# Patient Record
Sex: MALE | Race: WHITE | NOT HISPANIC OR LATINO | Employment: OTHER | URBAN - METROPOLITAN AREA
[De-identification: names, ages, dates, MRNs, and addresses within clinical notes are randomized per-mention and may not be internally consistent; named-entity substitution may affect disease eponyms.]

---

## 2017-01-20 ENCOUNTER — HOSPITAL ENCOUNTER (EMERGENCY)
Facility: HOSPITAL | Age: 57
Discharge: HOME | End: 2017-01-20
Attending: EMERGENCY MEDICINE | Admitting: EMERGENCY MEDICINE

## 2017-01-20 ENCOUNTER — APPOINTMENT (OUTPATIENT)
Dept: GENERAL RADIOLOGY | Facility: HOSPITAL | Age: 57
End: 2017-01-20

## 2017-01-20 VITALS
RESPIRATION RATE: 18 BRPM | HEIGHT: 65 IN | DIASTOLIC BLOOD PRESSURE: 72 MMHG | SYSTOLIC BLOOD PRESSURE: 113 MMHG | BODY MASS INDEX: 29.99 KG/M2 | WEIGHT: 180 LBS | HEART RATE: 56 BPM | OXYGEN SATURATION: 95 % | TEMPERATURE: 98.1 F

## 2017-01-20 DIAGNOSIS — R91.1 PULMONARY NODULE: ICD-10-CM

## 2017-01-20 DIAGNOSIS — R07.89 OTHER CHEST PAIN: Primary | ICD-10-CM

## 2017-01-20 DIAGNOSIS — G40.909 SEIZURE DISORDER (HCC): ICD-10-CM

## 2017-01-20 LAB
ALBUMIN SERPL-MCNC: 3.9 G/DL (ref 3.2–4.8)
ALBUMIN/GLOB SERPL: 1.1 G/DL (ref 1.5–2.5)
ALP SERPL-CCNC: 98 U/L (ref 25–100)
ALT SERPL W P-5'-P-CCNC: 45 U/L (ref 7–40)
ANION GAP SERPL CALCULATED.3IONS-SCNC: 15 MMOL/L (ref 3–11)
AST SERPL-CCNC: 24 U/L (ref 0–33)
BASOPHILS # BLD AUTO: 0.17 10*3/MM3 (ref 0–0.2)
BASOPHILS NFR BLD AUTO: 2.7 % (ref 0–1)
BILIRUB SERPL-MCNC: 0.6 MG/DL (ref 0.3–1.2)
BNP SERPL-MCNC: 11 PG/ML (ref 0–100)
BUN BLD-MCNC: 18 MG/DL (ref 9–23)
BUN/CREAT SERPL: 16.4 (ref 7–25)
CALCIUM SPEC-SCNC: 9.8 MG/DL (ref 8.7–10.4)
CHLORIDE SERPL-SCNC: 104 MMOL/L (ref 99–109)
CO2 SERPL-SCNC: 28 MMOL/L (ref 20–31)
CREAT BLD-MCNC: 1.1 MG/DL (ref 0.6–1.3)
DEPRECATED RDW RBC AUTO: 47.8 FL (ref 37–54)
EOSINOPHIL # BLD AUTO: 0.24 10*3/MM3 (ref 0.1–0.3)
EOSINOPHIL NFR BLD AUTO: 3.8 % (ref 0–3)
ERYTHROCYTE [DISTWIDTH] IN BLOOD BY AUTOMATED COUNT: 13.2 % (ref 11.3–14.5)
GFR SERPL CREATININE-BSD FRML MDRD: 69 ML/MIN/1.73
GLOBULIN UR ELPH-MCNC: 3.5 GM/DL
GLUCOSE BLD-MCNC: 90 MG/DL (ref 70–100)
HCT VFR BLD AUTO: 47.3 % (ref 38.9–50.9)
HGB BLD-MCNC: 16.2 G/DL (ref 13.1–17.5)
HOLD SPECIMEN: NORMAL
HOLD SPECIMEN: NORMAL
IMM GRANULOCYTES # BLD: 0.04 10*3/MM3 (ref 0–0.03)
IMM GRANULOCYTES NFR BLD: 0.6 % (ref 0–0.6)
LIPASE SERPL-CCNC: 37 U/L (ref 6–51)
LYMPHOCYTES # BLD AUTO: 1.44 10*3/MM3 (ref 0.6–4.8)
LYMPHOCYTES NFR BLD AUTO: 22.7 % (ref 24–44)
MCH RBC QN AUTO: 34 PG (ref 27–31)
MCHC RBC AUTO-ENTMCNC: 34.2 G/DL (ref 32–36)
MCV RBC AUTO: 99.4 FL (ref 80–99)
MONOCYTES # BLD AUTO: 0.58 10*3/MM3 (ref 0–1)
MONOCYTES NFR BLD AUTO: 9.2 % (ref 0–12)
NEUTROPHILS # BLD AUTO: 3.86 10*3/MM3 (ref 1.5–8.3)
NEUTROPHILS NFR BLD AUTO: 61 % (ref 41–71)
PLATELET # BLD AUTO: 168 10*3/MM3 (ref 150–450)
PMV BLD AUTO: 9.3 FL (ref 6–12)
POTASSIUM BLD-SCNC: 4 MMOL/L (ref 3.5–5.5)
PROT SERPL-MCNC: 7.4 G/DL (ref 5.7–8.2)
RBC # BLD AUTO: 4.76 10*6/MM3 (ref 4.2–5.76)
SODIUM BLD-SCNC: 147 MMOL/L (ref 132–146)
TROPONIN I SERPL-MCNC: 0 NG/ML (ref 0–0.07)
TROPONIN I SERPL-MCNC: 0 NG/ML (ref 0–0.07)
WBC NRBC COR # BLD: 6.33 10*3/MM3 (ref 3.5–10.8)
WHOLE BLOOD HOLD SPECIMEN: NORMAL
WHOLE BLOOD HOLD SPECIMEN: NORMAL

## 2017-01-20 PROCEDURE — 85025 COMPLETE CBC W/AUTO DIFF WBC: CPT | Performed by: EMERGENCY MEDICINE

## 2017-01-20 PROCEDURE — 99285 EMERGENCY DEPT VISIT HI MDM: CPT

## 2017-01-20 PROCEDURE — 84484 ASSAY OF TROPONIN QUANT: CPT

## 2017-01-20 PROCEDURE — 83690 ASSAY OF LIPASE: CPT | Performed by: EMERGENCY MEDICINE

## 2017-01-20 PROCEDURE — 80053 COMPREHEN METABOLIC PANEL: CPT | Performed by: EMERGENCY MEDICINE

## 2017-01-20 PROCEDURE — 93005 ELECTROCARDIOGRAM TRACING: CPT | Performed by: EMERGENCY MEDICINE

## 2017-01-20 PROCEDURE — 83880 ASSAY OF NATRIURETIC PEPTIDE: CPT | Performed by: EMERGENCY MEDICINE

## 2017-01-20 PROCEDURE — 71010 HC CHEST PA OR AP: CPT

## 2017-01-20 RX ORDER — LEVETIRACETAM 500 MG/1
TABLET ORAL
COMMUNITY

## 2017-01-20 RX ORDER — DOCUSATE SODIUM 100 MG/1
100 CAPSULE, LIQUID FILLED ORAL DAILY
COMMUNITY

## 2017-01-20 RX ORDER — MAGNESIUM HYDROXIDE/ALUMINUM HYDROXICE/SIMETHICONE 120; 1200; 1200 MG/30ML; MG/30ML; MG/30ML
30 SUSPENSION ORAL ONCE
Status: COMPLETED | OUTPATIENT
Start: 2017-01-20 | End: 2017-01-20

## 2017-01-20 RX ORDER — SODIUM CHLORIDE 0.9 % (FLUSH) 0.9 %
10 SYRINGE (ML) INJECTION AS NEEDED
Status: DISCONTINUED | OUTPATIENT
Start: 2017-01-20 | End: 2017-01-20 | Stop reason: HOSPADM

## 2017-01-20 RX ORDER — ASPIRIN 81 MG/1
81 TABLET ORAL DAILY
COMMUNITY

## 2017-01-20 RX ADMIN — LIDOCAINE HYDROCHLORIDE 15 ML: 20 SOLUTION ORAL; TOPICAL at 18:01

## 2017-01-20 RX ADMIN — ALUMINUM HYDROXIDE, MAGNESIUM HYDROXIDE, AND SIMETHICONE 30 ML: 200; 200; 20 SUSPENSION ORAL at 18:01

## 2017-01-20 NOTE — ED PROVIDER NOTES
Subjective   HPI Comments: Alessio William is a 56 y.o. male presenting to the ED with c/o chest pain. Mr. William reports that he has been having chest pain throughout the day today. The pain is located midsternally. He reports feeling sick lately with a runny nose, cough, and reduced appetite. Additional leg swelling. Denies any nausea, sweats, shortness of breath, fevers, dysuria, or chills. No other complaints at this time.    Patient is a 56 y.o. male presenting with chest pain.   History provided by:  Patient  Chest Pain   Pain location:  Substernal area  Pain radiates to:  Does not radiate  Pain severity:  Moderate  Onset quality:  Sudden  Duration:  1 day  Timing:  Constant  Progression:  Worsening  Chronicity:  Recurrent  Relieved by:  Nothing  Worsened by:  Nothing  Ineffective treatments:  None tried  Associated symptoms: cough and lower extremity edema    Associated symptoms: no abdominal pain, no back pain, no dizziness, no fatigue, no fever, no headache, no nausea, no numbness, no shortness of breath, no vomiting and no weakness        Review of Systems   Constitutional: Negative for chills, fatigue and fever.   HENT: Positive for rhinorrhea.    Respiratory: Positive for cough. Negative for shortness of breath.    Cardiovascular: Positive for chest pain and leg swelling.   Gastrointestinal: Negative for abdominal pain, diarrhea, nausea and vomiting.   Genitourinary: Negative for dysuria.   Musculoskeletal: Negative for back pain and joint swelling.   Skin: Negative for color change.   Neurological: Negative for dizziness, syncope, weakness, light-headedness, numbness and headaches.   All other systems reviewed and are negative.      Past Medical History   Diagnosis Date   • Anxiety    • Arthritis    • Cataract    • Down syndrome    • Down's syndrome    • Hernia of abdominal wall    • Hyperlipidemia    • Nasal septal defect    • Vitamin D deficiency    Discharge Summary from 7/25/16  Problem List:  Principal  Problem:  Encephalopathy  Active Problems:  Down syndrome  Depression  Anxiety  Hypotension  Sequela, post-stroke           Presenting Problem/History of Present Illness  Somnolence [R40.0]  Chest pain in adult [R07.9]      Chief Complaint on Day of Discharge:   History of Present Illness on Day of Discharge:      Hospital Course  Patient is a 55 y.o. male with a Hx of Down syndrome, depression, AUBREE, GERD and mild HLD who presented to [facility] ER 07/22/2016 with sudden onset somnolence/mental status change. He returned to his baseline functional state within one day. Diagnosed with encephalopathy, MRI shows a small crescentic area of abnormal signal in the right posterior parietal cortical distribution. Radiologist believes this to be an old insult. EEG was read as generalized slowing. Patient was started on Keppra. Neurology was consulted, suspects post-ictal state, subacute CVA. Reccommended carotid doppler, echocardiogram, outpatient neurology follow-up in 4-6 weeks, continue Keppra, lipitor, and aspirin. Patient to be discharged with these recommendations. To follow-up with PCP in one week.    ECHO from 7/23/16  Interpretation Summary   · All left ventricular wall segments contract normally.  · SUBMITTED FOR INTERPRETATION 25 JULY 2016; IV SALINE STUDIES UNINTERPRETABLE.  · Left ventricular function is normal. Estimated EF = 68%.         Carotid Duplex from 7/23/16  Interpretation Summary   · No hemodynamically significant carotid stenosis bilaterally.   From  visit  01-Aug-2016 3:44 PM ED General Note * Final, General Timothy Schneider MD (Resident) [Signed: 01-Aug-2016 3:48 PM], Donald Santiago MD (Attending) [Signed: 03-Aug-2016 11:20 PM] [Last Updated: 03-Aug-2016 11:20 PM]   Evaluation:   -    CHIEF COMPLAINT: Chest pain   HISTORY OF PRESENT ILLNESS: Patient is a 55-year-old male with past medical history of Down syndrome, GERD, recent CVA presents to the emergency department complaining of chest pain.  Patient has moderate MR and is a poor historian, he is accompanied by a non-primary caregiver at bedside. Per caregiver, patient began complaining of periodic chest pain this morning after breakfast. Patient reports this pain is substernal and does not radiate. He also endorses mild facial numbness, but denies headache, shortness of breath, abdominal pain.   PAST MEDICAL HISTORY: GERD, hyperlipidemia, Down syndrome, anxiety, CVA 2 weeks ago with subsequent seizures, entrapped hernia   PAST SURGICAL HISTORY: Laparoscopic hernia repair   ALLERGIES: Reviewed per nursing records.   SOCIAL HISTORY: Negative for current tobacco, alcohol, or recreational drug use.   FAMILY MEDICAL HISTORY: Reviewed with patient and not pertinent   REVIEW OF SYSTEMS: 14 point review of systems were reviewed with the patient and negative except as noted in the HPI.   VITALS (last 24h) [retrieved for ANTHONY ADAMS at 01 Aug 2016 15:45]:   Tc: 37.2 Tmax: 37.2 @ 01 Aug 15:14   Tf: 98.9 Tmax: 98.9 @ 01 Aug 15:14   HR: 77 (53 - 98)   BP: 92/61 (92/61 - 112/85)   RR: 25 (10 - 25)  SpO2: 96% (93% - 99%)   PHYSICAL EXAM:   GENERAL: Well-developed, well-nourished, no acute distress   HEENT: Normocephalic, atraumatic. PERRL. Mucous membranes moist   NECK: No LAD, neck supple, no tracheal deviation   HEART: Regular rhythm, no murmurs, gallops, or rubs appreciated. Chest wall mildly tender to palpation   LUNGS: Clear bilaterally with normal effort and good air movement. No wheezes, rales, or rhonchi   ABDOMEN: Soft, nontender, nondistended. Bowel sounds present   EXTREMITIES: Moving all four extremities with no acute deformity or joint effusions   SKIN: Warm, dry, wellperfused, no rashes   NEURO: Alert, awake, and oriented to person, place, and time. Grossly nonfocal with intact strength and sensation to bilateral upper and lower extremities   LABS (last 24h) [retrieved for ANTHONY ADAMS at 01 Aug 2016 15:45]:   143  105  17   --------------------< 93  Ca: 8.9 [08/01 @ 10:34]   3.9  26  1.19   WBC: 5.2 / Hb: 16.2 / Hct: 46.9 / Plt: 170 [08/01 @ 10:34]   AST: 21 / ALT: 41 / AlkPhos: 118 / Bili: 0.4 / Prot: 7.0 / Alb: 3.3 [08/01 @ 10:34]   IMAGING:   IMPRESSION:   Scattered bilateral lung opacifications may be related to scarring or   atelectasis.   Focal opacification in the right lower lobe most likely scarring.   Minimal blunting of the bilateral costophrenic angles was also seen on   the previous exam and may be related to pleural thickening or tiny   pleural fluid collections.   Test Reason : Chest pain     Vent. Rate : 055 BPM Atrial Rate : 055 BPM   P-R Int : 170 ms QRS Dur : 102 ms   QT Int : 394 ms P-R-T Axes : 063 047 045 degrees   QTc Int : 376 ms   Sinus bradycardia   Otherwise normal ECG   MEDICAL DECISION MAKING: Patient was seen and examined with Dr. Santiago. In summary, this is a 55-year-old male presenting to the ED for evaluation of chest pain.   Given report of chest pain with questionable history, we plan to obtain CBC, CMP, troponin, chest x-ray, EKG to evaluate for ACS, aortic dissection, esophageal rupture, aortic dissection etc. We’ll also give GI cocktail for symptomatic relief if this pain is due to his GERD.  REEVALUATION: Labs and imaging as above, stable sinus bradycardia noted otherwise unremarkable. Patient reports improvement in symptoms status post GI cocktail. Troponin ×2 was negative, workup for ACS and all other threatening causes of chest pain is negative to this point. Given patient's postprandial pain combined with GERD history and his description of this pain, this is likely exacerbation of his GERD symptoms. Printed discharge patient home with instructions to continue taking his GERD medication and follow-up with his primary care physician for further management if symptoms persist.  IMPRESSION: GERD      Allergies   Allergen Reactions   • Penicillins    • Promethazine        Past Surgical History   Procedure Laterality Date    • Cataract extraction         History reviewed. No pertinent family history.    Social History     Social History   • Marital status: Single     Spouse name: N/A   • Number of children: N/A   • Years of education: N/A     Social History Main Topics   • Smoking status: Never Smoker   • Smokeless tobacco: None   • Alcohol use No   • Drug use: No   • Sexual activity: No     Other Topics Concern   • None     Social History Narrative         Objective   Physical Exam   Constitutional: He is oriented to person, place, and time. He appears well-developed and well-nourished. No distress.   Mild stigmata of Down Syndrome, but can converse and answer all questions appropriately.   HENT:   Head: Normocephalic and atraumatic.   Mouth/Throat: Oropharynx is clear and moist.   Eyes: Conjunctivae and EOM are normal.   Neck: Normal range of motion. Neck supple. No JVD present.   Cardiovascular: Normal rate, regular rhythm, normal heart sounds and intact distal pulses.    Pulmonary/Chest: Effort normal and breath sounds normal. No respiratory distress. He exhibits no tenderness.   Abdominal: Soft. Bowel sounds are normal. There is no tenderness.   Mildly obese.   Musculoskeletal: Normal range of motion. He exhibits edema (Trace edema at the ankles. Compression stockings in place. Equal pulses.). He exhibits no tenderness.   Lymphadenopathy:     He has no cervical adenopathy.   Neurological: He is alert and oriented to person, place, and time.   Skin: Skin is warm and dry. No erythema.   Psychiatric: He has a normal mood and affect. His behavior is normal.   Nursing note and vitals reviewed.      Procedures         ED Course  ED Course           HEART Score  History: Slightly suspicious (+0)  ECG: Normal (+0)  Risk Factors: 1 - 2 risk factors (+1)  Troponin: Normal limit or lower (+0)         Course of Care      Lab Results (last 24 hours)     Procedure Component Value Units Date/Time    CBC & Differential [[id number]] Collected:   01/20/17 1837    Specimen:  Blood Updated:  01/20/17 1849    Narrative:       The following orders were created for panel order CBC & Differential.  Procedure                               Abnormality         Status                     ---------                               -----------         ------                     CBC Auto Differential[[id number]]         Abnormal            Final result                 Please view results for these tests on the individual orders.    Comprehensive Metabolic Panel [[id number]]  (Abnormal) Collected:  01/20/17 1837    Specimen:  Blood Updated:  01/20/17 1908     Glucose 90 mg/dL      BUN 18 mg/dL      Creatinine 1.10 mg/dL      Sodium 147 (H) mmol/L      Potassium 4.0 mmol/L      Chloride 104 mmol/L      CO2 28.0 mmol/L      Calcium 9.8 mg/dL      Total Protein 7.4 g/dL      Albumin 3.90 g/dL      ALT (SGPT) 45 (H) U/L      AST (SGOT) 24 U/L      Alkaline Phosphatase 98 U/L      Total Bilirubin 0.6 mg/dL      eGFR Non African Amer 69 mL/min/1.73      Globulin 3.5 gm/dL      A/G Ratio 1.1 (L) g/dL      BUN/Creatinine Ratio 16.4      Anion Gap 15.0 (H) mmol/L     Narrative:       [employer] Guidelines    Stage                           Description                             GFR                      1                               Normal or High                          90+  2                               Mild decrease                            60-89  3                               Moderate decrease                   30-59  4                               Severe decrease                       15-29  5                               Kidney failure                             <15    Lipase [[id number]]  (Normal) Collected:  01/20/17 1837    Specimen:  Blood Updated:  01/20/17 1908     Lipase 37 U/L     BNP [[id number]]  (Normal) Collected:  01/20/17 1837    Specimen:  Blood Updated:  01/20/17 1914     BNP 11.0 pg/mL     CBC Auto Differential [[id number]]  (Abnormal)  Collected:  01/20/17 1837    Specimen:  Blood Updated:  01/20/17 1849     WBC 6.33 10*3/mm3      RBC 4.76 10*6/mm3      Hemoglobin 16.2 g/dL      Hematocrit 47.3 %      MCV 99.4 (H) fL      MCH 34.0 (H) pg      MCHC 34.2 g/dL      RDW 13.2 %      RDW-SD 47.8 fl      MPV 9.3 fL      Platelets 168 10*3/mm3      Neutrophil % 61.0 %      Lymphocyte % 22.7 (L) %      Monocyte % 9.2 %      Eosinophil % 3.8 (H) %      Basophil % 2.7 (H) %      Immature Grans % 0.6 %      Neutrophils, Absolute 3.86 10*3/mm3      Lymphocytes, Absolute 1.44 10*3/mm3      Monocytes, Absolute 0.58 10*3/mm3      Eosinophils, Absolute 0.24 10*3/mm3      Basophils, Absolute 0.17 10*3/mm3      Immature Grans, Absolute 0.04 (H) 10*3/mm3     POC Troponin, Rapid [[id number]]  (Normal) Collected:  01/20/17 1839    Specimen:  Blood Updated:  01/20/17 1900     Troponin I 0.00 ng/mL       Serial Number: [id number]    : [id number]       POC Troponin, Rapid [[id number]]  (Normal) Collected:  01/20/17 2036    Specimen:  Blood Updated:  01/20/17 2055     Troponin I 0.00 ng/mL       Serial Number: [id number]    : [id number]             Note: In addition to lab results from this visit, the labs listed above may include labs taken at another facility or during a different encounter within the last 24 hours. Please correlate lab times with ED admission and discharge times for further clarification of the services performed during this visit.    XR Chest 1 View   Final Result   Volume loss at the bases with patchy ill-defined areas of   atelectatic density. No other acute abnormality is noted.       There is a small nodule in the right suprahilar region which is slightly   more prominent when compared to 07/22/2016. Continued careful follow-up   or acquisition of a CT data set is suggested.       DICTATED:     01/20/2017   EDITED:          01/20/2017       This report was finalized on 1/20/2017 6:32 PM by Dr. Gaston Nazario MD.              Vitals:    01/20/17  1800 01/20/17 1830 01/20/17 1930 01/20/17 2031   BP: 106/72 115/82 101/67 113/72   BP Location:       Patient Position:       Pulse: 50 53 (!) 48 56   Resp:       Temp:       TempSrc:       SpO2: 95% 96% 94% 95%   Weight:       Height:           Medications   sodium chloride 0.9 % flush 10 mL (not administered)   aluminum-magnesium hydroxide-simethicone (MAALOX/MYLANTA) suspension 30 mL (30 mL Oral Given 1/20/17 1801)   lidocaine viscous (XYLOCAINE) 2 % mouth solution 15 mL (15 mL Mouth/Throat Given 1/20/17 1801)       ECG/EMG Results (last 24 hours)     Procedure Component Value Units Date/Time    ECG 12 Lead [[id number]] Collected:  01/20/17 1711     Updated:  01/20/17 1728    Narrative:       Test Reason : CHEST PAIN  Blood Pressure : **/** mmHG  Vent. Rate : 054 BPM     Atrial Rate : 054 BPM     P-R Int : 160 ms          QRS Dur : 102 ms      QT Int : 404 ms       P-R-T Axes : 069 046 049 degrees     QTc Int : 383 ms    Sinus bradycardia  Otherwise normal ECG  When compared with ECG of 23-JUL-2016 10:19,  No significant change was found  Confirmed by SUNNY DEJESUS MD ([id number]) on 1/20/2017 5:28:23 PM    Referred By:  JAM CHOW           Confirmed By:SUNNY DEJESUS MD    ECG 12 Lead [[id number]] Collected:  01/20/17 2034     Updated:  01/20/17 2034              MDM  Number of Diagnoses or Management Options  Other chest pain:   Pulmonary nodule:   Seizure disorder:   Diagnosis management comments:       Reviewed all available studies at the bedside with the patient and his caregiver.  They are reassuring.  His sodium is trivially elevated.  He does have a pulmonary nodule may be a little bit bigger on his chest x-ray.  His cardiac markers and EKGs are stable.    He does have some risk factors for coronary artery disease but certainly his history is does not suggest unstable angina.  His caregiver said he may have had a seizure earlier today as well.    Currently he is neurologically at his baseline and comfortable and  pain-free.  Will refer him to our chest pain clinic for follow-up.  I will also refer him to Dr. Herman's pulmonary nodule clinic.  I have him call his primary care doctor for follow-up and for referral back to his seizure doctor for further evaluation.    He'll return to the ER if worsen anyway.    All are agreeable with the plan       Amount and/or Complexity of Data Reviewed  Clinical lab tests: reviewed  Tests in the radiology section of CPT®: reviewed  Tests in the medicine section of CPT®: reviewed  Decide to obtain previous medical records or to obtain history from someone other than the patient: yes        Final diagnoses:   Other chest pain   Pulmonary nodule   Seizure disorder   EMR Dragon/Transcription disclaimer:   Much of this encounter note is an electronic transcription/translation of spoken language to printed text. The electronic translation of spoken language may permit erroneous, or at times, nonsensical words or phrases to be inadvertently transcribed; Although I have reviewed the note for such errors, some may still exist.       Documentation assistance provided by svetlana Aldridge.  Information recorded by the scribe was done at my direction and has been verified and validated by me.     Bro CAST Oly  01/20/17 1726       Bro CAST Oly  01/20/17 1738       Bro CAST Oly  01/20/17 2105       Bro Villavicenciodiqi  01/20/17 2111       Anselmo French MD  01/20/17 4349

## 2017-01-21 NOTE — DISCHARGE INSTRUCTIONS

## 2017-02-03 ENCOUNTER — OFFICE VISIT (OUTPATIENT)
Dept: CARDIOLOGY | Facility: HOSPITAL | Age: 57
End: 2017-02-03

## 2017-02-03 VITALS
RESPIRATION RATE: 20 BRPM | SYSTOLIC BLOOD PRESSURE: 106 MMHG | OXYGEN SATURATION: 95 % | TEMPERATURE: 98 F | DIASTOLIC BLOOD PRESSURE: 59 MMHG | HEART RATE: 59 BPM | HEIGHT: 65 IN | WEIGHT: 175 LBS | BODY MASS INDEX: 29.16 KG/M2

## 2017-02-03 DIAGNOSIS — E78.5 DYSLIPIDEMIA: ICD-10-CM

## 2017-02-03 DIAGNOSIS — R00.1 BRADYCARDIA: ICD-10-CM

## 2017-02-03 DIAGNOSIS — R42 DIZZINESS: ICD-10-CM

## 2017-02-03 DIAGNOSIS — R06.02 SHORTNESS OF BREATH: ICD-10-CM

## 2017-02-03 DIAGNOSIS — R91.1 LUNG NODULE: ICD-10-CM

## 2017-02-03 DIAGNOSIS — R07.89 CHEST PAIN, ATYPICAL: Primary | ICD-10-CM

## 2017-02-03 DIAGNOSIS — I95.9 HYPOTENSION, UNSPECIFIED HYPOTENSION TYPE: ICD-10-CM

## 2017-02-03 PROCEDURE — 93225 XTRNL ECG REC<48 HRS REC: CPT | Performed by: INTERNAL MEDICINE

## 2017-02-03 PROCEDURE — 99215 OFFICE O/P EST HI 40 MIN: CPT | Performed by: NURSE PRACTITIONER

## 2017-02-03 NOTE — PROGRESS NOTES
[facility]  [facility]  Chest Pain Clinic    Encounter Date:02/03/2017     Alessio William  [address] [place] [id number]  [phone]    1960    Chikis Simmons MD    Alessio William is a 56 y.o. male.      Subjective:     Chief Complaint:  Establish Care (s/p ED visit for Chest Pain)       HPI     55 yo male with Down's syndrome, hyperlipidemia, anxiety presented to [facility] ED with CP on 01/20/17.  Pt is a poor historian, is accompanied by a healthcare assistant from his residential facility.    Reports CP, intermittent, midsternal, daily, worsening.  Radiates to back and arms.  Associated:  Dyspnea, dizziness, swelling.  Worsened with movement, touching, anxiety, exertion.  He reports intermittent palpitations.  Reports a hx of stress test approx 2 years ago, reported normal.  EKG acceptable for no ST segment changes, troponins normal.  Patient was noted to be bradycardic heart rate 40s and 50s.  Patient is not on a heart rate lowering medication.    Found to have a nodule on last CXR that has changed from previous CXRs.  Scheduled to see PCP for CT Scan and pulmonary referral in 1 week.    Recently hospitalized for [facility] on 07/22/2016 with sudden onset somnolence/mental status change. Diagnosed with encephalopathy, MRI shows a small crescentic area of abnormal signal in the right posterior parietal cortical distribution. Radiologist believes this to be an old insult. EEG was read as generalized slowing. Patient was started on Keppra. Neurology was consulted, suspects post-ictal state, subacute CVA.    Cardiac risk factors:  History dyslipidemia, hypertension  sedentary lifestyle, obesity (BMI > 30), gender, age (>50)      Allergies   Allergen Reactions   • Penicillins    • Promethazine          Current Outpatient Prescriptions:   •  acetaminophen (TYLENOL) 325 MG tablet, Take 650 mg by mouth every 6 (six) hours as needed for mild pain (1-3)., Disp: , Rfl:   •   aspirin 81 MG EC tablet, Take 81 mg by mouth Daily., Disp: , Rfl:   •  atorvastatin (LIPITOR) 10 MG tablet, Take 10 mg by mouth daily., Disp: , Rfl:   •  docusate sodium (COLACE) 100 MG capsule, Take 100 mg by mouth Daily., Disp: , Rfl:   •  DULoxetine (CYMBALTA) 30 MG capsule, Take 30 mg by mouth daily., Disp: , Rfl:   •  famotidine (PEPCID) 20 MG tablet, Take 20 mg by mouth 2 (two) times a day., Disp: , Rfl:   •  fluticasone (FLONASE) 50 MCG/ACT nasal spray, 1 spray by Each Nare route daily., Disp: , Rfl:   •  levETIRAcetam (KEPPRA) 500 MG tablet, 250mg in the morning and 500mg at night, Disp: , Rfl:   •  loperamide (IMODIUM) 2 MG capsule, Take 4 mg by mouth 2 (two) times a day as needed for diarrhea., Disp: , Rfl:   •  loratadine (CLARITIN) 10 MG tablet, Take 10 mg by mouth daily., Disp: , Rfl:   •  polyethylene glycol (MIRALAX) packet, Take 17 g by mouth daily as needed., Disp: , Rfl:   No current facility-administered medications for this visit.     The following portions of the patient's history were reviewed and updated as appropriate in Epic:  Problem list, allergies, current medications, past medical and surgical history, past social and family history.     Review of Systems   Constitution: Positive for diaphoresis, weakness, malaise/fatigue and night sweats. Negative for chills, decreased appetite, fever, weight gain and weight loss.   HENT: Positive for congestion and headaches. Negative for nosebleeds.    Eyes: Negative for blurred vision, visual disturbance and visual halos.   Cardiovascular: Positive for chest pain, dyspnea on exertion and palpitations. Negative for claudication, cyanosis, irregular heartbeat, leg swelling, near-syncope, orthopnea, paroxysmal nocturnal dyspnea and syncope.   Respiratory: Positive for cough, sputum production and wheezing. Negative for hemoptysis, shortness of breath, sleep disturbances due to breathing and snoring.    Endocrine: Positive for polydipsia. Negative for  "cold intolerance, heat intolerance, polyphagia and polyuria.   Hematologic/Lymphatic: Does not bruise/bleed easily.   Skin: Positive for dry skin. Negative for itching and rash.   Musculoskeletal: Positive for arthritis and joint pain. Negative for falls, joint swelling, muscle weakness and myalgias.   Gastrointestinal: Positive for constipation. Negative for bloating, abdominal pain, diarrhea, dysphagia, heartburn, melena, nausea and vomiting.   Genitourinary: Negative for dysuria, flank pain, hematuria and nocturia.   Neurological: Positive for dizziness. Negative for difficulty with concentration, excessive daytime sleepiness and loss of balance.   Psychiatric/Behavioral: Negative for altered mental status and depression. The patient is nervous/anxious.    Allergic/Immunologic: Negative for environmental allergies.       Objective:     Vitals:    02/03/17 0936 02/03/17 0937 02/03/17 0938   BP: 118/59 109/58 106/59   BP Location: Right arm Left arm Left arm   Patient Position: Sitting Sitting Standing   Cuff Size: Adult     Pulse: 57  59   Resp: 20     Temp: 98 °F (36.7 °C)     TempSrc: Temporal Artery      SpO2: 95%     Weight: 175 lb (79.4 kg)     Height: 65\" (165.1 cm)           Physical Exam   Constitutional: He is oriented to person, place, and time. He appears well-developed and well-nourished. No distress.   HENT:   Head: Normocephalic and atraumatic.   Mouth/Throat: Oropharynx is clear and moist.   Eyes: Conjunctivae are normal. Pupils are equal, round, and reactive to light. No scleral icterus.   Neck: No hepatojugular reflux and no JVD present. Carotid bruit is not present. No tracheal deviation present. No thyromegaly present.   Cardiovascular: Normal rate, regular rhythm, normal heart sounds and intact distal pulses.  Exam reveals no friction rub.    No murmur heard.  Pulmonary/Chest: Effort normal and breath sounds normal.   Abdominal: Soft. Bowel sounds are normal. He exhibits no distension. There is " no tenderness.   Musculoskeletal: He exhibits no edema.   Lymphadenopathy:     He has no cervical adenopathy.   Neurological: He is alert and oriented to person, place, and time.   Skin: Skin is warm, dry and intact. No rash noted. No cyanosis or erythema. No pallor.   Psychiatric: He has a normal mood and affect. His behavior is normal. Thought content normal.   Vitals reviewed.      Lab and Diagnostic Review:  Echocardiogram 7/23/2016: EF 68%, LV wall segments contracts normally    Carotid duplex, 7/23/2016: Right ICA proximal: Arterial path torturous, right vertebral: Antegrade flow present, left vertebral: Antegrade flow present    Admission on 01/20/2017, Discharged on 01/20/2017   Component Date Value Ref Range Status   • Glucose 01/20/2017 90  70 - 100 mg/dL Final   • BUN 01/20/2017 18  9 - 23 mg/dL Final   • Creatinine 01/20/2017 1.10  0.60 - 1.30 mg/dL Final   • Sodium 01/20/2017 147* 132 - 146 mmol/L Final   • Potassium 01/20/2017 4.0  3.5 - 5.5 mmol/L Final   • Chloride 01/20/2017 104  99 - 109 mmol/L Final   • CO2 01/20/2017 28.0  20.0 - 31.0 mmol/L Final   • Calcium 01/20/2017 9.8  8.7 - 10.4 mg/dL Final   • Total Protein 01/20/2017 7.4  5.7 - 8.2 g/dL Final   • Albumin 01/20/2017 3.90  3.20 - 4.80 g/dL Final   • ALT (SGPT) 01/20/2017 45* 7 - 40 U/L Final   • AST (SGOT) 01/20/2017 24  0 - 33 U/L Final   • Alkaline Phosphatase 01/20/2017 98  25 - 100 U/L Final   • Total Bilirubin 01/20/2017 0.6  0.3 - 1.2 mg/dL Final   • eGFR Non  Amer 01/20/2017 69  >60 mL/min/1.73 Final   • Globulin 01/20/2017 3.5  gm/dL Final   • A/G Ratio 01/20/2017 1.1* 1.5 - 2.5 g/dL Final   • BUN/Creatinine Ratio 01/20/2017 16.4  7.0 - 25.0 Final   • Anion Gap 01/20/2017 15.0* 3.0 - 11.0 mmol/L Final   • Lipase 01/20/2017 37  6 - 51 U/L Final   • BNP 01/20/2017 11.0  0.0 - 100.0 pg/mL Final   • Extra Tube 01/20/2017 hold for add-on   Final    Auto resulted   • Extra Tube 01/20/2017 Hold for add-ons.   Final    Auto  resulted.   • Extra Tube 01/20/2017 hold for add-on   Final    Auto resulted   • Extra Tube 01/20/2017 Hold for add-ons.   Final    Auto resulted.   • WBC 01/20/2017 6.33  3.50 - 10.80 10*3/mm3 Final   • RBC 01/20/2017 4.76  4.20 - 5.76 10*6/mm3 Final   • Hemoglobin 01/20/2017 16.2  13.1 - 17.5 g/dL Final   • Hematocrit 01/20/2017 47.3  38.9 - 50.9 % Final   • MCV 01/20/2017 99.4* 80.0 - 99.0 fL Final   • MCH 01/20/2017 34.0* 27.0 - 31.0 pg Final   • MCHC 01/20/2017 34.2  32.0 - 36.0 g/dL Final   • RDW 01/20/2017 13.2  11.3 - 14.5 % Final   • RDW-SD 01/20/2017 47.8  37.0 - 54.0 fl Final   • MPV 01/20/2017 9.3  6.0 - 12.0 fL Final   • Platelets 01/20/2017 168  150 - 450 10*3/mm3 Final   • Neutrophil % 01/20/2017 61.0  41.0 - 71.0 % Final   • Lymphocyte % 01/20/2017 22.7* 24.0 - 44.0 % Final   • Monocyte % 01/20/2017 9.2  0.0 - 12.0 % Final   • Eosinophil % 01/20/2017 3.8* 0.0 - 3.0 % Final   • Basophil % 01/20/2017 2.7* 0.0 - 1.0 % Final   • Immature Grans % 01/20/2017 0.6  0.0 - 0.6 % Final   • Neutrophils, Absolute 01/20/2017 3.86  1.50 - 8.30 10*3/mm3 Final   • Lymphocytes, Absolute 01/20/2017 1.44  0.60 - 4.80 10*3/mm3 Final   • Monocytes, Absolute 01/20/2017 0.58  0.00 - 1.00 10*3/mm3 Final   • Eosinophils, Absolute 01/20/2017 0.24  0.10 - 0.30 10*3/mm3 Final   • Basophils, Absolute 01/20/2017 0.17  0.00 - 0.20 10*3/mm3 Final   • Immature Grans, Absolute 01/20/2017 0.04* 0.00 - 0.03 10*3/mm3 Final   • Troponin I 01/20/2017 0.00  0.00 - 0.07 ng/mL Final    Serial Number: [id number]    : [id number]   • Troponin I 01/20/2017 0.00  0.00 - 0.07 ng/mL Final    Serial Number: [id number]    : [id number]       Chest x-ray 1/20/2017: Small nodule in the right subhilar region, slightly more prominent when compared to chest x-ray 7/22/2016.    EKG sinus bradycardia, 54 bpm    Assessment and Plan:     1. Chest pain, atypical  (Cardiac origin vs GERD vs anxiety)    - Stress Test With Myocardial Perfusion (1 Day);  Future  Pt unable to complete GXT due to fall risk (unsteady gait), bilateral knee pain    2. Shortness of breath      3. Dizziness      4. Bradycardia    - Holter Monitor - 24 Hour; Future    5. Hypotension, unspecified hypotension type  Hx of hypotesnion, no antihypertensive meds.      6. Dyslipidemia  On statin    7.  Lung nodule:  F/u with CT scan and pulmonary referral per PCP    F/u pending test results      *Please note that portions of this note were completed with a voice recognition program. Efforts were made to edit the dictations, but occasionally words are mistranscribed.

## 2017-02-03 NOTE — PATIENT INSTRUCTIONS
You will be called with continued testing if needed.    You will be called with follow up appointment if needed.    Please see your primary care provider for repeat CT scan of your lungs.    Call if you have any questions or concerns.

## 2017-02-15 PROCEDURE — 93227 XTRNL ECG REC<48 HR R&I: CPT | Performed by: INTERNAL MEDICINE

## 2017-02-23 ENCOUNTER — APPOINTMENT (OUTPATIENT)
Dept: CARDIOLOGY | Facility: HOSPITAL | Age: 57
End: 2017-02-23

## 2017-03-07 ENCOUNTER — OFFICE VISIT (OUTPATIENT)
Dept: PULMONOLOGY | Facility: CLINIC | Age: 57
End: 2017-03-07

## 2017-03-07 ENCOUNTER — DOCUMENTATION (OUTPATIENT)
Dept: CARDIOLOGY | Facility: HOSPITAL | Age: 57
End: 2017-03-07

## 2017-03-07 VITALS
RESPIRATION RATE: 16 BRPM | SYSTOLIC BLOOD PRESSURE: 106 MMHG | WEIGHT: 171 LBS | OXYGEN SATURATION: 95 % | TEMPERATURE: 98.5 F | HEIGHT: 62 IN | DIASTOLIC BLOOD PRESSURE: 68 MMHG | BODY MASS INDEX: 31.47 KG/M2 | HEART RATE: 52 BPM

## 2017-03-07 DIAGNOSIS — R91.1 LUNG NODULE: ICD-10-CM

## 2017-03-07 DIAGNOSIS — R06.02 SHORTNESS OF BREATH: Primary | ICD-10-CM

## 2017-03-07 PROCEDURE — 94200 LUNG FUNCTION TEST (MBC/MVV): CPT | Performed by: NURSE PRACTITIONER

## 2017-03-07 PROCEDURE — 99214 OFFICE O/P EST MOD 30 MIN: CPT | Performed by: NURSE PRACTITIONER

## 2017-03-07 PROCEDURE — 94010 BREATHING CAPACITY TEST: CPT | Performed by: NURSE PRACTITIONER

## 2017-03-07 RX ORDER — DIAPER,BRIEF,INFANT-TODD,DISP
EACH MISCELLANEOUS 2 TIMES DAILY
COMMUNITY

## 2017-03-07 RX ORDER — IODINE/SODIUM IODIDE 2 %
TINCTURE TOPICAL EVERY 8 HOURS PRN
COMMUNITY

## 2017-03-07 NOTE — PROGRESS NOTES
[facility] for chest pain and c/o palpitations.  Ordered Holter (patient was a no show in [facility] for monitor) and Lexiscan (patient called and cancelled).

## 2017-03-07 NOTE — PROGRESS NOTES
[facility] Pulmonary Evaluation    CHIEF COMPLAINT    Shortness of air, pulmonary nodule    Refered by:        HISTORY OF PRESENT ILLNESS    Alessio William is a 56 y.o.male here today for evaluation of a pulmonary nodule with complaints of shortness of breath.  He is seen in the emergency department by Dr. Lemos on January 20 with complaints of chest pain and shortness of breath.  His cardiac enzymes were negative his EKG was negative.  He has since followed up with cardiology and plans on undergoing a stress test.  During his ER course and workup he did have a chest x-ray that showed a possibly enlarged pulmonary nodule in the right upper lobe.    He does complain of continued shortness of breath with any activity as well as some chest tightness.  He denies any wheezing.  He denies any cough or sputum production.  He does feel like his heart races at times and he gets dizzy on occasion.  He did try some medicine for reflux which did not help his symptoms.    He does have Down syndrome and lives at a independent living home with a worker.  He takes care of all of his activities of daily living.  He is a poor historian given his moderate mental retardation.  He does have a history of some recurrent visits to the emergency department due to chest pain and shortness of breath.    Patient Active Problem List   Diagnosis   • Down syndrome   • Depression   • Anxiety   • GERD (gastroesophageal reflux disease)   • Omental infarction   • Hernia of abdominal wall   • Vitamin D deficiency   • Arthritis   • Onychomycosis   • Encephalopathy   • Hypotension   • Sequela, post-stroke   • Chest pain, atypical   • Shortness of breath   • Dizziness   • Bradycardia   • Dyslipidemia   • Lung nodule       Allergies   Allergen Reactions   • Hydrochlorothiazide    • Penicillins    • Promethazine        Current Outpatient Prescriptions:   •  acetaminophen (TYLENOL) 325 MG tablet, Take 650 mg by mouth every 6 (six) hours as needed for mild  pain (1-3)., Disp: , Rfl:   •  aspirin 81 MG EC tablet, Take 81 mg by mouth Daily., Disp: , Rfl:   •  atorvastatin (LIPITOR) 10 MG tablet, Take 10 mg by mouth daily., Disp: , Rfl:   •  calamine 8-8 % lotion lotion, Apply  topically Every 8 (Eight) Hours As Needed., Disp: , Rfl:   •  docusate sodium (COLACE) 100 MG capsule, Take 100 mg by mouth Daily., Disp: , Rfl:   •  DULoxetine (CYMBALTA) 30 MG capsule, Take 30 mg by mouth daily., Disp: , Rfl:   •  famotidine (PEPCID) 20 MG tablet, Take 20 mg by mouth 2 (two) times a day., Disp: , Rfl:   •  fluticasone (FLONASE) 50 MCG/ACT nasal spray, 1 spray by Each Nare route daily., Disp: , Rfl:   •  hydrocortisone 1 % cream, Apply  topically 2 (Two) Times a Day., Disp: , Rfl:   •  levETIRAcetam (KEPPRA) 500 MG tablet, 250mg in the morning and 500mg at night, Disp: , Rfl:   •  loperamide (IMODIUM) 2 MG capsule, Take 4 mg by mouth 2 (two) times a day as needed for diarrhea., Disp: , Rfl:   •  loratadine (CLARITIN) 10 MG tablet, Take 10 mg by mouth daily., Disp: , Rfl:   •  polyethylene glycol (MIRALAX) packet, Take 17 g by mouth daily as needed., Disp: , Rfl:     MEDICATION LIST AND ALLERGIES REVIEWED.    Social History   Substance Use Topics   • Smoking status: Never Smoker   • Smokeless tobacco: Never Used   • Alcohol use No       FAMILY AND SOCIAL HISTORY REVIEWED AND UPDATED IN EPIC  His brother is a physician who lives in [place].    Review of Systems   Constitutional: Negative for chills, fatigue, fever and unexpected weight change.   HENT: Negative for congestion, nosebleeds, postnasal drip, rhinorrhea, sinus pressure and trouble swallowing.    Respiratory: Positive for shortness of breath. Negative for cough, chest tightness and wheezing.    Cardiovascular: Positive for palpitations. Negative for chest pain (hest heaviness) and leg swelling.   Gastrointestinal: Negative for abdominal pain, constipation, diarrhea, nausea and vomiting.   Genitourinary: Negative for  "dysuria, frequency, hematuria and urgency.   Musculoskeletal: Negative for myalgias.   Neurological: Negative for dizziness, weakness, numbness and headaches.   All other systems reviewed and are negative.  .    Visit Vitals   • /68   • Pulse 52   • Temp 98.5 °F (36.9 °C)   • Resp 16   • Ht 62\" (157.5 cm)   • Wt 171 lb (77.6 kg)   • SpO2 95%  Comment: RA   • BMI 31.28 kg/m2     Physical Exam   Constitutional: He is oriented to person, place, and time. He appears well-developed and well-nourished.   HENT:   Head: Normocephalic and atraumatic.   Eyes: EOM are normal. Pupils are equal, round, and reactive to light.   Neck: Normal range of motion. Neck supple.   Cardiovascular: Normal rate and regular rhythm.    No murmur heard.  Pulmonary/Chest: Effort normal and breath sounds normal. No respiratory distress. He has no wheezes. He has no rales.   Abdominal: Soft. Bowel sounds are normal. He exhibits no distension.   Musculoskeletal: Normal range of motion. He exhibits no edema.   Neurological: He is alert and oriented to person, place, and time.   Skin: Skin is warm and dry. No erythema.   Psychiatric: He has a normal mood and affect. His behavior is normal.   Vitals reviewed.      RESULTS    Lab Results   Component Value Date    WBC 6.33 01/20/2017    HGB 16.2 01/20/2017    HCT 47.3 01/20/2017    MCV 99.4 (H) 01/20/2017     01/20/2017     Lab Results   Component Value Date    GLUCOSE 90 01/20/2017    CALCIUM 9.8 01/20/2017     (H) 01/20/2017    K 4.0 01/20/2017    CO2 28.0 01/20/2017     01/20/2017    BUN 18 01/20/2017    CREATININE 1.10 01/20/2017    EGFRIFNONA 69 01/20/2017    BCR 16.4 01/20/2017    ANIONGAP 15.0 (H) 01/20/2017       PFTs done in the office today:  Possible mild restriction, no TLC      PA/LAT CXR done in the office today:  No acute changes, atelectasis improved from Jan 20, 2017.  Right upper lobe density unchanged      PROBLEM LIST    Problem List Items Addressed This " Visit        Respiratory    Shortness of breath - Primary    Relevant Orders    Spirometry Without Bronchodilator (Completed)    XR Chest PA & Lateral (Completed)    CT Chest Without Contrast    Lung nodule            DISCUSSION    Follow up CT of chest to follow up and eval nodular opacity.    A short trial of Breo for shortness of breath and chest tightness.      Continue follow-up with cardiology, stress test is due to be scheduled and pending.    Hit the medical coordinator was in the room with him and understood the above directions.    Follow up in 6 weeks.    ALEM Peñaloza  03/07/201712:27 PM  Electronically signed     Please note that portions of this note were completed with a voice recognition program. Efforts were made to edit the dictations, but occasionally words are mistranscribed.      CC: Chikis Simmons MD

## 2017-03-14 ENCOUNTER — HOSPITAL ENCOUNTER (OUTPATIENT)
Dept: CT IMAGING | Facility: HOSPITAL | Age: 57
Discharge: HOME | End: 2017-03-14
Admitting: NURSE PRACTITIONER

## 2017-03-14 PROCEDURE — 71250 CT THORAX DX C-: CPT

## 2017-03-17 ENCOUNTER — TELEPHONE (OUTPATIENT)
Dept: PULMONOLOGY | Facility: CLINIC | Age: 57
End: 2017-03-17

## 2017-03-17 NOTE — TELEPHONE ENCOUNTER
Contacted patients care giver ([facility]) and advised her that patients CT was normal Per Oksana MEIER and hi next appointment is on 4/19 @ 12:00. She understood and had no further qustions

## 2017-03-24 ENCOUNTER — OFFICE VISIT (OUTPATIENT)
Dept: CARDIOLOGY | Facility: CLINIC | Age: 57
End: 2017-03-24

## 2017-03-24 DIAGNOSIS — R42 DIZZINESS: ICD-10-CM

## 2017-06-20 ENCOUNTER — OFFICE VISIT (OUTPATIENT)
Dept: PULMONOLOGY | Facility: CLINIC | Age: 57
End: 2017-06-20

## 2017-06-20 VITALS
OXYGEN SATURATION: 95 % | RESPIRATION RATE: 16 BRPM | WEIGHT: 173 LBS | TEMPERATURE: 97.2 F | HEART RATE: 52 BPM | HEIGHT: 62 IN | DIASTOLIC BLOOD PRESSURE: 70 MMHG | SYSTOLIC BLOOD PRESSURE: 104 MMHG | BODY MASS INDEX: 31.83 KG/M2

## 2017-06-20 DIAGNOSIS — R91.1 LUNG NODULE: ICD-10-CM

## 2017-06-20 DIAGNOSIS — R06.02 SHORTNESS OF BREATH: Primary | ICD-10-CM

## 2017-06-20 DIAGNOSIS — K21.9 GASTROESOPHAGEAL REFLUX DISEASE, ESOPHAGITIS PRESENCE NOT SPECIFIED: ICD-10-CM

## 2017-06-20 PROCEDURE — 99213 OFFICE O/P EST LOW 20 MIN: CPT | Performed by: NURSE PRACTITIONER

## 2017-06-20 RX ORDER — CHOLECALCIFEROL (VITAMIN D3) 1250 MCG
1 CAPSULE ORAL WEEKLY
Refills: 2 | COMMUNITY
Start: 2017-05-19

## 2017-06-20 RX ORDER — MOMETASONE FUROATE 50 UG/1
SPRAY, METERED NASAL DAILY
Refills: 6 | COMMUNITY
Start: 2017-05-24

## 2017-06-20 NOTE — PROGRESS NOTES
[facility] Pulmonary Follow up    CHIEF COMPLAINT    Dyspnea, pulmonary nodule    HISTORY OF PRESENT ILLNESS    Alessio William is a 56 y.o.male here today for follow-up after initial evaluation in March for shortness of air.  He was referred by the emergency department at [facility] due to some complaints of chest pain and shortness of breath.  He was evaluated by cardiology with plans to undergo Holter monitor and a stress test.  During his evaluation was also found have a pulmonary nodule the right upper lobe.  We followed up with a CT scan of his chest that did show some scattered reactive lymph nodes with a ill-defined shaggy appearing nodule that's stable from 2016.  Likely related to postinflammatory injury.    He does continue have some minimal shortness of breath and chest discomfort.  We did start him on a trial of Brio, he does feel like it is helping.  He is able to walk without any worsening dyspnea.         Patient Active Problem List   Diagnosis   • Down syndrome   • Depression   • Anxiety   • GERD (gastroesophageal reflux disease)   • Omental infarction   • Hernia of abdominal wall   • Vitamin D deficiency   • Arthritis   • Onychomycosis   • Encephalopathy   • Hypotension   • Sequela, post-stroke   • Chest pain, atypical   • Shortness of breath   • Dizziness   • Bradycardia   • Dyslipidemia   • Lung nodule       Allergies   Allergen Reactions   • Hydrochlorothiazide    • Penicillins    • Promethazine        Current Outpatient Prescriptions:   •  acetaminophen (TYLENOL) 325 MG tablet, Take 650 mg by mouth every 6 (six) hours as needed for mild pain (1-3)., Disp: , Rfl:   •  aspirin 81 MG EC tablet, Take 81 mg by mouth Daily., Disp: , Rfl:   •  atorvastatin (LIPITOR) 10 MG tablet, Take 10 mg by mouth daily., Disp: , Rfl:   •  calamine 8-8 % lotion lotion, Apply  topically Every 8 (Eight) Hours As Needed., Disp: , Rfl:   •  Cholecalciferol (VITAMIN D3) 55626 UNITS capsule, 1 capsule 1 (One) Time Per Week.  Saturdays, Disp: , Rfl: 2  •  DiphenhydrAMINE HCl, Sleep, 25 MG capsule, Take  by mouth As Needed., Disp: , Rfl:   •  docusate sodium (COLACE) 100 MG capsule, Take 100 mg by mouth Daily., Disp: , Rfl:   •  DULoxetine (CYMBALTA) 30 MG capsule, Take 30 mg by mouth daily., Disp: , Rfl:   •  famotidine (PEPCID) 20 MG tablet, Take 20 mg by mouth 2 (two) times a day., Disp: , Rfl:   •  Fluticasone Furoate-Vilanterol (BREO ELLIPTA) 100-25 MCG/INH aerosol powder , Inhale 100 mcg Daily., Disp: 1 each, Rfl: 11  •  hydrocortisone 1 % cream, Apply  topically 2 (Two) Times a Day., Disp: , Rfl:   •  levETIRAcetam (KEPPRA) 500 MG tablet, 250mg in the morning and 500mg at night, Disp: , Rfl:   •  loperamide (IMODIUM) 2 MG capsule, Take 4 mg by mouth 2 (two) times a day as needed for diarrhea., Disp: , Rfl:   •  loratadine (CLARITIN) 10 MG tablet, Take 10 mg by mouth daily., Disp: , Rfl:   •  mometasone (NASONEX) 50 MCG/ACT nasal spray, Daily., Disp: , Rfl: 6  •  polyethylene glycol (MIRALAX) packet, Take 17 g by mouth daily as needed., Disp: , Rfl:   MEDICATION LIST AND ALLERGIES REVIEWED.    Social History   Substance Use Topics   • Smoking status: Never Smoker   • Smokeless tobacco: Never Used   • Alcohol use No       FAMILY AND SOCIAL HISTORY REVIEWED.    Review of Systems   Constitutional: Negative for chills, fatigue, fever and unexpected weight change.   HENT: Negative for congestion, nosebleeds, postnasal drip, rhinorrhea, sinus pressure and trouble swallowing.    Respiratory: Negative for cough, chest tightness, shortness of breath and wheezing.    Cardiovascular: Negative for chest pain and leg swelling.   Gastrointestinal: Negative for abdominal pain, constipation, diarrhea, nausea and vomiting.   Genitourinary: Negative for dysuria, frequency, hematuria and urgency.   Musculoskeletal: Negative for myalgias.   Neurological: Negative for dizziness, weakness, numbness and headaches.   All other systems reviewed and are  "negative.  .    /70  Pulse 52  Temp 97.2 °F (36.2 °C)  Resp 16  Ht 62\" (157.5 cm)  Wt 173 lb (78.5 kg)  SpO2 95% Comment: RA  BMI 31.64 kg/m2    Physical Exam   Constitutional: He is oriented to person, place, and time. He appears well-developed and well-nourished.   HENT:   Head: Normocephalic and atraumatic.   Eyes: EOM are normal. Pupils are equal, round, and reactive to light.   Neck: Normal range of motion. Neck supple.   Cardiovascular: Normal rate and regular rhythm.    No murmur heard.  Pulmonary/Chest: Effort normal and breath sounds normal. No respiratory distress. He has no wheezes. He has no rales.   Abdominal: Soft. Bowel sounds are normal. He exhibits no distension.   Musculoskeletal: Normal range of motion. He exhibits no edema.   Neurological: He is alert and oriented to person, place, and time.   Skin: Skin is warm and dry. No erythema.   Psychiatric: He has a normal mood and affect. His behavior is normal.   Vitals reviewed.        RESULTS     CT Chest 3/2017  1. Scattered reactive normal size lymph nodes in the central chest.  2. Multiple ill-defined shaggy appearing nodules are seen in the  periphery of the lungs with mild pleural scarring. The nodules at the  bases have been seen on CT datasets of the chest and are stable from  07/22/2016. Comparison CT dataset to cover the upper and mid lung zones  is not available.      It is likely, from the distribution and the configuration that these  nodules are postinflammatory insults and, therefore, conservative  follow-up with surveillance to document interval stability is suggested  in approximately 6 months.      3. Otherwise, there is no dominant mass, consolidation, free fluid or  bulky adenopathy elsewhere.        PROBLEM LIST    Problem List Items Addressed This Visit        Respiratory    Shortness of breath - Primary    Lung nodule       Digestive    GERD (gastroesophageal reflux disease)            DISCUSSION    At this time " there is no need for follow-up CT scan, it appears they change on his chest x-rays from chronic inflammation changes.  His shortness of breath about the same may be a little improved on the Breo.  He will like to continue the Breo for now.  I sent a prescription into his pharmacy.  Follow up in one year with repeat PFTs     Oksana Manzanares, APRN  06/20/20172:56 PM  Electronically signed     Please note that portions of this note were completed with a voice recognition program. Efforts were made to edit the dictations, but occasionally words are mistranscribed.      CC: Chikis Simmons MD

## 2018-09-19 ENCOUNTER — OFFICE VISIT (OUTPATIENT)
Dept: PULMONOLOGY | Facility: CLINIC | Age: 58
End: 2018-09-19

## 2018-09-19 VITALS
DIASTOLIC BLOOD PRESSURE: 70 MMHG | SYSTOLIC BLOOD PRESSURE: 110 MMHG | BODY MASS INDEX: 30.91 KG/M2 | TEMPERATURE: 98.4 F | WEIGHT: 168 LBS | OXYGEN SATURATION: 96 % | HEART RATE: 76 BPM | HEIGHT: 62 IN

## 2018-09-19 DIAGNOSIS — R06.02 SOB (SHORTNESS OF BREATH): Primary | ICD-10-CM

## 2018-09-19 DIAGNOSIS — R06.02 SHORTNESS OF BREATH: ICD-10-CM

## 2018-09-19 DIAGNOSIS — Q90.9 DOWN SYNDROME: ICD-10-CM

## 2018-09-19 DIAGNOSIS — R91.1 LUNG NODULE: ICD-10-CM

## 2018-09-19 DIAGNOSIS — K21.9 GASTROESOPHAGEAL REFLUX DISEASE, ESOPHAGITIS PRESENCE NOT SPECIFIED: ICD-10-CM

## 2018-09-19 PROCEDURE — 94375 RESPIRATORY FLOW VOLUME LOOP: CPT | Performed by: NURSE PRACTITIONER

## 2018-09-19 PROCEDURE — 99213 OFFICE O/P EST LOW 20 MIN: CPT | Performed by: NURSE PRACTITIONER

## 2018-09-19 NOTE — PROGRESS NOTES
[facility] Pulmonary Follow up    CHIEF COMPLAINT    Dyspnea    HISTORY OF PRESENT ILLNESS    Alessio William is a 57 y.o.male here today for routine follow-up.  I saw him about a year ago for some shortness of air.  He does have mild restriction on his PFTs, he takes Breo daily.  He does have an albuterol home but does not take it.  He has no worsening wheezing, shortness of breath.  Has no cough or sputum production.  He denies any chest pain chest pressure palpitations.  He's had no acute illnesses since I saw him last.    He has had a abnormal CT scan with post inflammatory nodular scars bilaterally, stable from 2016.    His independent living care provider is with him today, she reports no shortness of air or complaints.    Patient Active Problem List   Diagnosis   • Down syndrome   • Depression   • Anxiety   • GERD (gastroesophageal reflux disease)   • Omental infarction (CMS/HCC)   • Hernia of abdominal wall   • Vitamin D deficiency   • Arthritis   • Onychomycosis   • Encephalopathy   • Hypotension   • Sequela, post-stroke   • Chest pain, atypical   • Shortness of breath   • Dizziness   • Bradycardia   • Dyslipidemia   • Lung nodule       Allergies   Allergen Reactions   • Hydrochlorothiazide    • Penicillins    • Promethazine        Current Outpatient Prescriptions:   •  acetaminophen (TYLENOL) 325 MG tablet, Take 650 mg by mouth every 6 (six) hours as needed for mild pain (1-3)., Disp: , Rfl:   •  aspirin 81 MG EC tablet, Take 81 mg by mouth Daily., Disp: , Rfl:   •  atorvastatin (LIPITOR) 10 MG tablet, Take 10 mg by mouth daily., Disp: , Rfl:   •  BREO ELLIPTA 100-25 MCG/INH aerosol powder , INHALE 1 PUFF EVERY MORNING *BRUSH TEETH AFTER USE*, Disp: 1 each, Rfl: 3  •  calamine 8-8 % lotion lotion, Apply  topically Every 8 (Eight) Hours As Needed., Disp: , Rfl:   •  Cholecalciferol (VITAMIN D3) 41107 UNITS capsule, 1 capsule 1 (One) Time Per Week. Saturdays, Disp: , Rfl: 2  •  DiphenhydrAMINE HCl, Sleep, 25 MG  "capsule, Take  by mouth As Needed., Disp: , Rfl:   •  docusate sodium (COLACE) 100 MG capsule, Take 100 mg by mouth Daily., Disp: , Rfl:   •  DULoxetine (CYMBALTA) 30 MG capsule, Take 30 mg by mouth daily., Disp: , Rfl:   •  famotidine (PEPCID) 20 MG tablet, Take 20 mg by mouth 2 (two) times a day., Disp: , Rfl:   •  hydrocortisone 1 % cream, Apply  topically 2 (Two) Times a Day., Disp: , Rfl:   •  levETIRAcetam (KEPPRA) 500 MG tablet, 250mg in the morning and 500mg at night, Disp: , Rfl:   •  loperamide (IMODIUM) 2 MG capsule, Take 4 mg by mouth 2 (two) times a day as needed for diarrhea., Disp: , Rfl:   •  loratadine (CLARITIN) 10 MG tablet, Take 10 mg by mouth daily., Disp: , Rfl:   •  mometasone (NASONEX) 50 MCG/ACT nasal spray, Daily., Disp: , Rfl: 6  •  polyethylene glycol (MIRALAX) packet, Take 17 g by mouth daily as needed., Disp: , Rfl:   MEDICATION LIST AND ALLERGIES REVIEWED.    Social History   Substance Use Topics   • Smoking status: Never Smoker   • Smokeless tobacco: Never Used   • Alcohol use No       FAMILY AND SOCIAL HISTORY REVIEWED.    Review of Systems   Constitutional: Negative for chills, fatigue, fever and unexpected weight change.   HENT: Negative for congestion, nosebleeds, postnasal drip, rhinorrhea, sinus pressure and trouble swallowing.    Respiratory: Negative for cough, chest tightness, shortness of breath and wheezing.    Cardiovascular: Negative for chest pain and leg swelling.   Gastrointestinal: Negative for abdominal pain, constipation, diarrhea, nausea and vomiting.   Genitourinary: Negative for dysuria, frequency, hematuria and urgency.   Musculoskeletal: Negative for myalgias.   Neurological: Negative for dizziness, weakness, numbness and headaches.   All other systems reviewed and are negative.  .    /70   Pulse 76   Temp 98.4 °F (36.9 °C)   Ht 157.5 cm (62\")   Wt 76.2 kg (168 lb)   SpO2 96% Comment: RA @ Rest  BMI 30.73 kg/m²       There is no immunization history " on file for this patient.    Physical Exam   Constitutional: He is oriented to person, place, and time. He appears well-developed and well-nourished.   HENT:   Head: Normocephalic and atraumatic.   Eyes: Pupils are equal, round, and reactive to light. EOM are normal.   Neck: Normal range of motion. Neck supple.   Cardiovascular: Normal rate and regular rhythm.    No murmur heard.  Pulmonary/Chest: Effort normal and breath sounds normal. No respiratory distress. He has no wheezes. He has no rales.   Abdominal: Soft. Bowel sounds are normal. He exhibits no distension.   Musculoskeletal: Normal range of motion. He exhibits no edema.   Neurological: He is alert and oriented to person, place, and time.   Skin: Skin is warm and dry. No erythema.   Psychiatric: He has a normal mood and affect. His behavior is normal.   Vitals reviewed.        RESULTS    PFTS in the office today, read by me:  Mild obstructive airway disease with an FEV1 2.14, 77%.  No significant changes from 2017.    PROBLEM LIST    Problem List Items Addressed This Visit        Respiratory    Shortness of breath    Lung nodule       Digestive    GERD (gastroesophageal reflux disease)       Other    Down syndrome      Other Visit Diagnoses     SOB (shortness of breath)    -  Primary    Relevant Orders    Spirometry Without Bronchodilator (Completed)    XR Chest PA & Lateral            DISCUSSION    He isn't quite well on the Breo, we will continue on that daily.  He's had no acute exacerbations or acute illnesses.  He does think the inhaler helps his dyspnea on exertion.    Continue with his activity as tolerated.    Follow-up annually or as needed.    I did recommend he get his pneumonia vaccine as well as his flu shot this year.    I spent 15 minutes with the patient and caregiver. I spent > 50% percent of this time counseling and discussing diagnostic testing, evaluation, current status and management.    Oksana Manzanares, APRN  09/19/201812:37  PM  Electronically signed     Please note that portions of this note were completed with a voice recognition program. Efforts were made to edit the dictations, but occasionally words are mistranscribed.      CC: Chikis Simmons MD

## 2019-12-18 ENCOUNTER — HOSPITAL ENCOUNTER (EMERGENCY)
Facility: HOSPITAL | Age: 59
Discharge: HOME | End: 2019-12-18
Attending: EMERGENCY MEDICINE | Admitting: EMERGENCY MEDICINE

## 2019-12-18 ENCOUNTER — APPOINTMENT (OUTPATIENT)
Dept: GENERAL RADIOLOGY | Facility: HOSPITAL | Age: 59
End: 2019-12-18

## 2019-12-18 VITALS
BODY MASS INDEX: 31.72 KG/M2 | HEIGHT: 61 IN | SYSTOLIC BLOOD PRESSURE: 128 MMHG | WEIGHT: 168 LBS | OXYGEN SATURATION: 91 % | RESPIRATION RATE: 18 BRPM | HEART RATE: 64 BPM | DIASTOLIC BLOOD PRESSURE: 80 MMHG | TEMPERATURE: 96.4 F

## 2019-12-18 DIAGNOSIS — R07.9 CHEST PAIN, UNSPECIFIED TYPE: Primary | ICD-10-CM

## 2019-12-18 LAB
ALBUMIN SERPL-MCNC: 3.6 G/DL (ref 3.5–5.2)
ALBUMIN/GLOB SERPL: 0.9 G/DL
ALP SERPL-CCNC: 104 U/L (ref 39–117)
ALT SERPL W P-5'-P-CCNC: 25 U/L (ref 1–41)
ANION GAP SERPL CALCULATED.3IONS-SCNC: 10 MMOL/L (ref 5–15)
AST SERPL-CCNC: 18 U/L (ref 1–40)
BASOPHILS # BLD AUTO: 0.2 10*3/MM3 (ref 0–0.2)
BASOPHILS NFR BLD AUTO: 2 % (ref 0–1.5)
BILIRUB SERPL-MCNC: 0.6 MG/DL (ref 0.2–1.2)
BUN BLD-MCNC: 19 MG/DL (ref 6–20)
BUN/CREAT SERPL: 17.3 (ref 7–25)
CALCIUM SPEC-SCNC: 9.4 MG/DL (ref 8.6–10.5)
CHLORIDE SERPL-SCNC: 99 MMOL/L (ref 98–107)
CO2 SERPL-SCNC: 28 MMOL/L (ref 22–29)
CREAT BLD-MCNC: 1.1 MG/DL (ref 0.76–1.27)
DEPRECATED RDW RBC AUTO: 47 FL (ref 37–54)
EOSINOPHIL # BLD AUTO: 0.17 10*3/MM3 (ref 0–0.4)
EOSINOPHIL NFR BLD AUTO: 1.7 % (ref 0.3–6.2)
ERYTHROCYTE [DISTWIDTH] IN BLOOD BY AUTOMATED COUNT: 12.5 % (ref 12.3–15.4)
GFR SERPL CREATININE-BSD FRML MDRD: 69 ML/MIN/1.73
GLOBULIN UR ELPH-MCNC: 3.9 GM/DL
GLUCOSE BLD-MCNC: 121 MG/DL (ref 65–99)
HCT VFR BLD AUTO: 51.9 % (ref 37.5–51)
HGB BLD-MCNC: 17.1 G/DL (ref 13–17.7)
HOLD SPECIMEN: NORMAL
HOLD SPECIMEN: NORMAL
IMM GRANULOCYTES # BLD AUTO: 0.08 10*3/MM3 (ref 0–0.05)
IMM GRANULOCYTES NFR BLD AUTO: 0.8 % (ref 0–0.5)
LYMPHOCYTES # BLD AUTO: 0.76 10*3/MM3 (ref 0.7–3.1)
LYMPHOCYTES NFR BLD AUTO: 7.7 % (ref 19.6–45.3)
MAGNESIUM SERPL-MCNC: 2.1 MG/DL (ref 1.6–2.6)
MCH RBC QN AUTO: 33.3 PG (ref 26.6–33)
MCHC RBC AUTO-ENTMCNC: 32.9 G/DL (ref 31.5–35.7)
MCV RBC AUTO: 101.2 FL (ref 79–97)
MONOCYTES # BLD AUTO: 0.75 10*3/MM3 (ref 0.1–0.9)
MONOCYTES NFR BLD AUTO: 7.6 % (ref 5–12)
NEUTROPHILS # BLD AUTO: 7.86 10*3/MM3 (ref 1.7–7)
NEUTROPHILS NFR BLD AUTO: 80.2 % (ref 42.7–76)
NRBC BLD AUTO-RTO: 0 /100 WBC (ref 0–0.2)
NT-PROBNP SERPL-MCNC: 68.2 PG/ML (ref 5–900)
PLATELET # BLD AUTO: 156 10*3/MM3 (ref 140–450)
PMV BLD AUTO: 9 FL (ref 6–12)
POTASSIUM BLD-SCNC: 4.3 MMOL/L (ref 3.5–5.2)
PROT SERPL-MCNC: 7.5 G/DL (ref 6–8.5)
RBC # BLD AUTO: 5.13 10*6/MM3 (ref 4.14–5.8)
SODIUM BLD-SCNC: 137 MMOL/L (ref 136–145)
TROPONIN T SERPL-MCNC: <0.01 NG/ML (ref 0–0.03)
TROPONIN T SERPL-MCNC: <0.01 NG/ML (ref 0–0.03)
TSH SERPL DL<=0.05 MIU/L-ACNC: 2.01 UIU/ML (ref 0.27–4.2)
WBC NRBC COR # BLD: 9.82 10*3/MM3 (ref 3.4–10.8)
WHOLE BLOOD HOLD SPECIMEN: NORMAL
WHOLE BLOOD HOLD SPECIMEN: NORMAL

## 2019-12-18 PROCEDURE — 93005 ELECTROCARDIOGRAM TRACING: CPT | Performed by: EMERGENCY MEDICINE

## 2019-12-18 PROCEDURE — 25010000002 KETOROLAC TROMETHAMINE PER 15 MG: Performed by: EMERGENCY MEDICINE

## 2019-12-18 PROCEDURE — 84484 ASSAY OF TROPONIN QUANT: CPT | Performed by: EMERGENCY MEDICINE

## 2019-12-18 PROCEDURE — 84443 ASSAY THYROID STIM HORMONE: CPT | Performed by: EMERGENCY MEDICINE

## 2019-12-18 PROCEDURE — 83880 ASSAY OF NATRIURETIC PEPTIDE: CPT | Performed by: EMERGENCY MEDICINE

## 2019-12-18 PROCEDURE — 96374 THER/PROPH/DIAG INJ IV PUSH: CPT

## 2019-12-18 PROCEDURE — 83735 ASSAY OF MAGNESIUM: CPT | Performed by: EMERGENCY MEDICINE

## 2019-12-18 PROCEDURE — 99285 EMERGENCY DEPT VISIT HI MDM: CPT

## 2019-12-18 PROCEDURE — 71045 X-RAY EXAM CHEST 1 VIEW: CPT

## 2019-12-18 PROCEDURE — 85025 COMPLETE CBC W/AUTO DIFF WBC: CPT | Performed by: EMERGENCY MEDICINE

## 2019-12-18 PROCEDURE — 80053 COMPREHEN METABOLIC PANEL: CPT | Performed by: EMERGENCY MEDICINE

## 2019-12-18 RX ORDER — KETOROLAC TROMETHAMINE 15 MG/ML
15 INJECTION, SOLUTION INTRAMUSCULAR; INTRAVENOUS ONCE
Status: COMPLETED | OUTPATIENT
Start: 2019-12-18 | End: 2019-12-18

## 2019-12-18 RX ORDER — SODIUM CHLORIDE 0.9 % (FLUSH) 0.9 %
10 SYRINGE (ML) INJECTION AS NEEDED
Status: DISCONTINUED | OUTPATIENT
Start: 2019-12-18 | End: 2019-12-18 | Stop reason: HOSPADM

## 2019-12-18 RX ORDER — DONEPEZIL HYDROCHLORIDE 5 MG/1
5 TABLET, FILM COATED ORAL NIGHTLY
COMMUNITY

## 2019-12-18 RX ADMIN — KETOROLAC TROMETHAMINE 15 MG: 15 INJECTION, SOLUTION INTRAMUSCULAR; INTRAVENOUS at 11:12

## 2019-12-18 NOTE — ED PROVIDER NOTES
Subjective   Alessio William is a 59 y.o. male who presents to the ED with complaints of sudden onset substernal chest pain while relaxing at work. He also complains of shortness of breath and neck pain. However, he denies any nausea, fever, or headache. Per caregiver the patient has a history of attention seeking behavior, specifically faking a seizure or heart attack. However, she states that this episode does not seem like previous ones. She reports that the patient asked her where she worked which is unusual for him. EMS administered 324 mg ASA and 0.4 mg Nitroglyercin prior to arrival here at the emergency department. The patient states that it helped ease his pain some but did not resolve it. The patient takes 81 mg ASA daily. He has a history of HLD, anxiety, Down's syndrome, and recent diagnosis of Alzheimer's disease. His PCP is Dr. Simmons. There are no other acute complaints at this time.      History provided by:  Patient and caregiver  Chest Pain   Pain location:  Substernal area  Pain radiates to:  Does not radiate  Pain severity:  Moderate  Onset quality:  Sudden  Timing:  Constant  Progression:  Unchanged  Context: at rest    Relieved by:  Nothing  Worsened by:  Nothing  Ineffective treatments:  Aspirin and nitroglycerin  Associated symptoms: shortness of breath    Associated symptoms: no fever, no headache and no nausea    Risk factors: high cholesterol, male sex and obesity        Review of Systems   Constitutional: Negative for fever.   Respiratory: Positive for shortness of breath.    Cardiovascular: Positive for chest pain.   Gastrointestinal: Negative for nausea.   Musculoskeletal: Positive for neck pain.   Neurological: Negative for headaches.   All other systems reviewed and are negative.      Past Medical History:   Diagnosis Date   • Alzheimer's disease (CMS/[facility])    • Anxiety    • Arthritis    • Cataract    • Down syndrome    • Down's syndrome    • Hernia of abdominal wall    • Hyperlipidemia     • Nasal septal defect    • Vitamin D deficiency        Allergies   Allergen Reactions   • Hydrochlorothiazide    • Penicillins    • Promethazine        Past Surgical History:   Procedure Laterality Date   • CATARACT EXTRACTION         Family History   Problem Relation Age of Onset   • No Known Problems Mother    • No Known Problems Father    • No Known Problems Brother    • No Known Problems Brother    • No Known Problems Brother    • No Known Problems Brother        Social History     Socioeconomic History   • Marital status: Single     Spouse name: Not on file   • Number of children: Not on file   • Years of education: Not on file   • Highest education level: Not on file   Occupational History   • Occupation: Disabled   Tobacco Use   • Smoking status: Never Smoker   • Smokeless tobacco: Never Used   Substance and Sexual Activity   • Alcohol use: No   • Drug use: No   • Sexual activity: Never   Social History Narrative    Patient drinks 2 servings of caffeine per day.         Objective   Physical Exam   Constitutional: He is oriented to person, place, and time. He appears well-developed and well-nourished.   HENT:   Head: Normocephalic and atraumatic.   Nose: Nose normal.   Eyes: Conjunctivae are normal. No scleral icterus.   Neck: Normal range of motion. Neck supple.   Cardiovascular: Normal rate, regular rhythm and normal heart sounds.   No murmur heard.  Pulmonary/Chest: Effort normal and breath sounds normal. No respiratory distress. He exhibits tenderness.   Left parasternal chest wall tenderness which reproduces his pain.   Abdominal: Soft. He exhibits no distension. There is no tenderness. There is no rebound and no guarding.   Musculoskeletal: Normal range of motion. He exhibits no deformity.   Neurological: He is alert and oriented to person, place, and time.   Skin: Skin is warm and dry.   Psychiatric: He has a normal mood and affect. His behavior is normal.   Nursing note and vitals  reviewed.      Procedures         ED Course     EKG NSR.  CXR negative. Labs benign. Pain resolved with Toradol.  2nd set negative.  Chart indicates about a year ago he was referred to cardiology for provocative testing and did not follow through. Recommend he do that this time though I think this particular pain is unlikely to be cardiac.  Patient stable on serial rechecks.  Discussed findings, concerns, plan of care, expected course, reasons to return and followup.  Provided the opportunity to ask questions.                  MDM  Number of Diagnoses or Management Options  Chest pain, unspecified type:      Amount and/or Complexity of Data Reviewed  Clinical lab tests: reviewed and ordered  Tests in the radiology section of CPT®: reviewed and ordered  Decide to obtain previous medical records or to obtain history from someone other than the patient: yes  Obtain history from someone other than the patient: yes  Review and summarize past medical records: yes  Independent visualization of images, tracings, or specimens: yes        Final diagnoses:   Chest pain, unspecified type       Documentation assistance provided by svetlana Pitts.  Information recorded by the scribe was done at my direction and has been verified and validated by me.     Nelda Pitts  12/18/19 [id number]       Dean Owens MD  12/18/19 [id number]

## 2022-11-16 ENCOUNTER — TRANSCRIBE ORDERS (OUTPATIENT)
Dept: NUTRITION | Facility: HOSPITAL | Age: 62
End: 2022-11-16

## 2022-11-16 DIAGNOSIS — R13.10 PROBLEMS WITH SWALLOWING AND MASTICATION: Primary | ICD-10-CM
